# Patient Record
Sex: FEMALE | Race: WHITE | NOT HISPANIC OR LATINO | Employment: UNEMPLOYED | URBAN - METROPOLITAN AREA
[De-identification: names, ages, dates, MRNs, and addresses within clinical notes are randomized per-mention and may not be internally consistent; named-entity substitution may affect disease eponyms.]

---

## 2022-03-14 NOTE — PROGRESS NOTES
"  Physical Therapy Initial Evaluation and Plan of Care      Patient: Tavia Gómez   : 1980  Diagnosis/ICD-10 Code:  Status post ORIF of fracture of ankle [Z98.890, Z87.81]  Referring practitioner: Chacho Chowdhury MD  Date of Initial Visit: 3/15/2022  Today's Date: 3/15/2022  Patient seen for 1 sessions           Subjective Questionnaire: LEFS: 44/80      Subjective Evaluation    History of Present Illness  Date of onset: 2021  Date of surgery: 2021  Mechanism of injury: Pt presents to physical therapy s/p L ankle ORIF. Even up sole on RLE and walking/CAM boot on LLE. Pt reports fell down the stairs landing on L leg resulting in tri-malleolar fx. Next follow up in 2 weeks and going to [place] in 2.5 weeks for spring break with her family. No longer using crutches or scooter.    Hobbies: physical activity, hiking, running, working out      Patient Occupation: not working Quality of life: excellent    Pain  Current pain ratin (not pain, just discomfort)  At best pain ratin  At worst pain ratin  Location: L ankle  Aggravating factors: movement, stairs, standing, ambulation, prolonged positioning, squatting and repetitive movement    Social Support  Lives in: multiple-level home  Lives with: young children    Hand dominance: right    Diagnostic Tests  X-ray: abnormal (fracture in 3 places (trimalleolar))    Patient Goals  Patient goals for therapy: decreased edema, decreased pain, improved balance, increased motion, increased strength and return to sport/leisure activities  Patient goal: \"running on treadmill\" and \"walking up/down steps\"           Objective          Observations   Left Ankle/Foot   Positive for edema, effusion and incision. Negative for drainage.     Additional Ankle/Foot Observation Details  No s/s of injection    Palpation   Left   No palpable tenderness to the lateral gastrocnemius and medial gastrocnemius.   Hypertonic in the peroneus, plantaris, posterior tibialis " and soleus.   Tenderness of the anterior tibialis.     Tenderness   Left Ankle/Foot   Tenderness in the Achilles insertion, anterior ankle, lateral malleolus, medial malleolus and peroneal tendon. No tenderness in the fifth metatarsal base, dorsum foot, metatarsal head and proximal Achilles.     Neurological Testing     Sensation     Ankle/Foot   Left Ankle/Foot   Intact: light touch  Hypersensation: light touch    Additional Neurological Details  Numbness constant    Active Range of Motion   Left Ankle/Foot   Dorsiflexion (ke): 6 degrees with pain  Plantar flexion: 66 degrees   Inversion: 32 degrees   Eversion: 20 degrees     Passive Range of Motion   Left Ankle/Foot    Dorsiflexion (ke): with pain    Strength/Myotome Testing     Left Ankle/Foot   Dorsiflexion: 4  Plantar flexion: 3-  Inversion: 4  Eversion: 4-    Additional Strength Details  Able to complete B heel raise with partial ROM; not able to complete single heel raise    Tests     Additional Tests Details  - jon's sign    Swelling   Left Ankle/Foot   Metatarsal heads: 21.5 cm  Figure 8: 48.5 cm  Malleoli: 23 cm    Right Ankle/Foot   Malleoli: 22 cm    Ambulation   Weight-Bearing Status   Weight-Bearing Status (Left): weight-bearing as tolerated     Observational Gait   Gait: antalgic   Decreased left stance time and left step length.   Left foot contact pattern: foot flat  Base of support: increased    Additional Observational Gait Details  Without boot/shoes          Assessment & Plan     Assessment  Impairments: abnormal gait, abnormal muscle firing, abnormal muscle tone, abnormal or restricted ROM, activity intolerance, impaired balance, impaired physical strength, lacks appropriate home exercise program, pain with function and weight-bearing intolerance  Functional Limitations: sleeping, walking, uncomfortable because of pain, moving in bed, standing, stooping and unable to perform repetitive tasks  Assessment details: Tavia Gómez is a 42 y.o.  year-old female referred to physical therapy for s/p L ankle ORIF (DOS 21). She presents with a stable clinical presentation; still using CAM/walking boot but weaned off crutches and knee scooter.  She has no comorbidities but personal factors of going on vacation in 2-3 weeks that may affect her progress in the plan of care. Pt has ortho MD follow up prior to vacation with hopeful release from walking boot. Signs and symptoms are consistent with physical therapy diagnosis of decreased L ankle AROM and L ankle weakness with impaired gait mechanics. Patient is appropriate for skilled physical therapy in order to reduce pain and increase ease with daily mobility.  Pt educated on desensitization to decrease hypersenstivity, ice as needed, walking over level surfaces with good shoes to prepare for vacation in 2-3 weeks for <5 at a time and initial HEP.  Prognosis: good    Goals  Plan Goals: ST weeks  1. Pt will have L ankle AROM WNL and pain-free in NWB position  2. Pt will demonstrate gait WNL and be able to consistently walk for ~20 min to navigate the grocery store without boot  3. Pt will be able to tolerate SLS on LLE for at least 10 sec without UE support to improve L ankle stability  4. Pt will be able to complete at least 20 B heel/toe raises standing to improve strength  5. Pt will decreased L malleoli swelling to 22 cm or less    LT weeks  1. Pt will demonstrates L ankle single leg heel raise without pain  2. Pt will demonstrate L ankle strength 5/5 in DF, PF, INV, and EV  3. Pt will be able to initiate plyometric training to be able to return to regular workouts and hiking  4.Pt will be able to tolerate SLS on LLE for at least 30 sec without UE support to improve L ankle stability  5. LEFS>60/80    Plan  Therapy options: will be seen for skilled therapy services  Planned modality interventions: cryotherapy, dry needling, electrical stimulation/Russian stimulation, iontophoresis, TENS,  thermotherapy (hydrocollator packs), traction and ultrasound  Planned therapy interventions: abdominal trunk stabilization, ADL retraining, balance/weight-bearing training, body mechanics training, flexibility, functional ROM exercises, gait training, home exercise program, IADL retraining, joint mobilization, transfer training, therapeutic activities, stretching, strengthening, spinal/joint mobilization, soft tissue mobilization, postural training, orthotic fitting/training, neuromuscular re-education and manual therapy  Frequency: 2x week  Treatment plan discussed with: patient  Plan details: 2x per week for 12 weeks        Visit Diagnoses:    ICD-10-CM ICD-9-CM   1. Status post ORIF of fracture of ankle  Z98.890 V45.89    Z87.81 V15.51   2. Decreased range of motion of left ankle  M25.672 719.57   3. Gait disturbance  R26.9 781.2   4. Left leg weakness  R29.898 729.89       Timed:  Manual Therapy:    -     mins  22637;  Therapeutic Exercise:    28     mins  35208;     Neuromuscular Contreras:    -    mins  85754;    Therapeutic Activity:     -     mins  88996;     Gait Training:      -     mins  85193;     Ultrasound:     -     mins  36013;    Electrical Stimulation:    -     mins  66245 ( );    Untimed:  Electrical Stimulation:    -     mins  29261 ( );  Mechanical Traction:    -     mins  65337;     Timed Treatment:   28   mins   Total Treatment:     55   mins    PT SIGNATURE: Anamaria Maynard PT   [place] license: [id number]  DATE TREATMENT INITIATED: 3/15/2022    Initial Certification  Certification Period: 6/13/2022  I certify that the therapy services are furnished while this patient is under my care.  The services outlined above are required by this patient, and will be reviewed every 90 days.     PHYSICIAN: Chacho Chowdhury MD      DATE:     Please sign and return via fax to [phone]. Thank you, [facility].

## 2022-03-15 ENCOUNTER — TREATMENT (OUTPATIENT)
Dept: PHYSICAL THERAPY | Facility: CLINIC | Age: 42
End: 2022-03-15

## 2022-03-15 DIAGNOSIS — R29.898 LEFT LEG WEAKNESS: ICD-10-CM

## 2022-03-15 DIAGNOSIS — M25.672 DECREASED RANGE OF MOTION OF LEFT ANKLE: ICD-10-CM

## 2022-03-15 DIAGNOSIS — Z87.81 STATUS POST ORIF OF FRACTURE OF ANKLE: Primary | ICD-10-CM

## 2022-03-15 DIAGNOSIS — R26.9 GAIT DISTURBANCE: ICD-10-CM

## 2022-03-15 DIAGNOSIS — Z98.890 STATUS POST ORIF OF FRACTURE OF ANKLE: Primary | ICD-10-CM

## 2022-03-15 PROCEDURE — 97162 PT EVAL MOD COMPLEX 30 MIN: CPT | Performed by: PHYSICAL THERAPIST

## 2022-03-15 PROCEDURE — 97110 THERAPEUTIC EXERCISES: CPT | Performed by: PHYSICAL THERAPIST

## 2022-03-17 ENCOUNTER — TREATMENT (OUTPATIENT)
Dept: PHYSICAL THERAPY | Facility: CLINIC | Age: 42
End: 2022-03-17

## 2022-03-17 DIAGNOSIS — Z87.81 STATUS POST ORIF OF FRACTURE OF ANKLE: Primary | ICD-10-CM

## 2022-03-17 DIAGNOSIS — R29.898 LEFT LEG WEAKNESS: ICD-10-CM

## 2022-03-17 DIAGNOSIS — Z98.890 STATUS POST ORIF OF FRACTURE OF ANKLE: Primary | ICD-10-CM

## 2022-03-17 DIAGNOSIS — R26.9 GAIT DISTURBANCE: ICD-10-CM

## 2022-03-17 DIAGNOSIS — M25.672 DECREASED RANGE OF MOTION OF LEFT ANKLE: ICD-10-CM

## 2022-03-17 PROCEDURE — 97110 THERAPEUTIC EXERCISES: CPT | Performed by: PHYSICAL THERAPIST

## 2022-03-17 NOTE — PROGRESS NOTES
Physical Therapy Daily Progress Note      Patient: Tavia Gómez   : 1980  Referring practitioner: No ref. provider found  Date of Initial Visit: Type: THERAPY  Noted: 3/15/2022  Today's Date: 3/17/2022  Patient seen for 2 sessions         Tavia Gómez reports: L ankle pain 2/10 today, increased pain yesterday but unrelated to PT exercises. Pt reports she still picks up her 4 year old from time to time which she contributes to her increased pain. Pt reports she called her surgeon and got permission to start weaning out of her boot.         Objective   See Exercise, Manual, and Modality Logs for complete treatment.       Assessment/Plan  Pt reports she called her surgeon and got permission to start weaning out of her boot; discussed slowly weaning for just a few hours initially prior to full transition out of the boot. Pt and PT also discussed appropriate shoe wear and possible ankle brace for support while more active on vacation in the next few weeks. Weak and painful eversion and inversion; no increased swelling since evaluation, but pt reports she has been icing. Progressed from NWB strengthening to WB exercises/stretches today. Updated and printed new HEP for home. Continued noted antalgia with gait without boot; pt asked to bring in normal tennis shoes for next session for formal gait training and appropriate support during standing exercises. Pt encouraged to continue to ice at home especially with increased activity and weaning out of boot.         Progress per Plan of Care and Progress strengthening /stabilization /functional activity           Timed:  Manual Therapy:    -     mins  68256;  Therapeutic Exercise:    25     mins  40537;     Neuromuscular Contreras:    -    mins  44673;    Therapeutic Activity:     5     mins  26582;     Gait Training:      -     mins  85963;     Ultrasound:     -     mins  01338;      Untimed:  Electrical Stimulation:    -     mins  46623 ( );  Mechanical Traction:     -     mins  09219;   Dry needling:      -     mins  20560/20561    Timed Treatment:   30   mins   Total Treatment:     35   mins    Anamaria Maynard PT  Physical Therapist    License #: [id number]

## 2022-03-23 ENCOUNTER — TREATMENT (OUTPATIENT)
Dept: PHYSICAL THERAPY | Facility: CLINIC | Age: 42
End: 2022-03-23

## 2022-03-23 DIAGNOSIS — Z87.81 STATUS POST ORIF OF FRACTURE OF ANKLE: Primary | ICD-10-CM

## 2022-03-23 DIAGNOSIS — R26.9 GAIT DISTURBANCE: ICD-10-CM

## 2022-03-23 DIAGNOSIS — Z98.890 STATUS POST ORIF OF FRACTURE OF ANKLE: Primary | ICD-10-CM

## 2022-03-23 DIAGNOSIS — R29.898 LEFT LEG WEAKNESS: ICD-10-CM

## 2022-03-23 DIAGNOSIS — M25.672 DECREASED RANGE OF MOTION OF LEFT ANKLE: ICD-10-CM

## 2022-03-23 PROCEDURE — 97110 THERAPEUTIC EXERCISES: CPT | Performed by: PHYSICAL THERAPIST

## 2022-03-23 PROCEDURE — 97140 MANUAL THERAPY 1/> REGIONS: CPT | Performed by: PHYSICAL THERAPIST

## 2022-03-23 NOTE — PROGRESS NOTES
Physical Therapy Daily Treatment Note      Patient: Tavia Gómez   : 1980  Referring practitioner: Chacho Chowdhury MD  Date of Initial Visit: Type: THERAPY  Noted: 3/15/2022  Today's Date: 3/23/2022  Patient seen for 3 sessions       Visit Diagnoses:    ICD-10-CM ICD-9-CM   1. Status post ORIF of fracture of ankle  Z98.890 V45.89    Z87.81 V15.51   2. Decreased range of motion of left ankle  M25.672 719.57   3. Gait disturbance  R26.9 781.2   4. Left leg weakness  R29.898 729.89       Subjective   Had ortho appt on Monday; X-ray looks fine and says I can't hurt my foot with regular daily activities.  Has been out of boot at home.  -3/10 when out of boot.  Sore along the inside of my lower leg.  MD recommended arch supports and a new pair of shoes.  Objective   See Exercise, Manual, and Modality Logs for complete treatment.       Assessment/Plan   Notable palp tenderness with fascial restrictions along post tib muscle/tendon.  Pt educated on OTC inserts for use in shoes.  Good tolerance to ex progression. Pt benefited from verbal/tactile cuing for proper open chain ankle inv/ev form.  Notable soreness with TB open chain ankle inversion so held resistance lightly.  Pt was issued updated HEP printout to facilitate compliance and recall with ex progressions today.  Progress per Plan of Care  Pt instructed to bring a shoe for L foot to the next appt for CKC progressions.            Timed:         Manual Therapy:    8     mins  34023;     Therapeutic Exercise:    26     mins  71363;     Neuromuscular Contreras:    -    mins  46153;    Therapeutic Activity:     5     mins  78837;     Gait Training:      -     mins  51028;       Timed Treatment:   39   mins   Total Treatment:     39   mins    Gladys Olmstead PT  [place] License: #[id number]

## 2022-03-24 ENCOUNTER — TREATMENT (OUTPATIENT)
Dept: PHYSICAL THERAPY | Facility: CLINIC | Age: 42
End: 2022-03-24

## 2022-03-24 DIAGNOSIS — R26.9 GAIT DISTURBANCE: ICD-10-CM

## 2022-03-24 DIAGNOSIS — Z87.81 STATUS POST ORIF OF FRACTURE OF ANKLE: Primary | ICD-10-CM

## 2022-03-24 DIAGNOSIS — R29.898 LEFT LEG WEAKNESS: ICD-10-CM

## 2022-03-24 DIAGNOSIS — M25.672 DECREASED RANGE OF MOTION OF LEFT ANKLE: ICD-10-CM

## 2022-03-24 DIAGNOSIS — Z98.890 STATUS POST ORIF OF FRACTURE OF ANKLE: Primary | ICD-10-CM

## 2022-03-24 PROCEDURE — 97140 MANUAL THERAPY 1/> REGIONS: CPT | Performed by: PHYSICAL THERAPIST

## 2022-03-24 PROCEDURE — 97110 THERAPEUTIC EXERCISES: CPT | Performed by: PHYSICAL THERAPIST

## 2022-03-24 NOTE — PROGRESS NOTES
Physical Therapy Daily Treatment Note      Patient: Tavia Gómez   : 1980  Referring practitioner: No ref. provider found  Date of Initial Visit: Type: THERAPY  Noted: 3/15/2022  Today's Date: 3/24/2022  Patient seen for 4 sessions       Visit Diagnoses:    ICD-10-CM ICD-9-CM   1. Status post ORIF of fracture of ankle  Z98.890 V45.89    Z87.81 V15.51   2. Decreased range of motion of left ankle  M25.672 719.57   3. Gait disturbance  R26.9 781.2   4. Left leg weakness  R29.898 729.89       Subjective   Pt reports she is still having pain along her lower leg  Objective   See Exercise, Manual, and Modality Logs for complete treatment.       Assessment/Plan   slight L lateral shift with mini-squat though well tolerated. Pt continues to exhibit pain with resisted ankle inv so added isometric with printout for strengthening and pain control. Did not add NuStep as pt did not bring shoe for L foot.  Good tolerance to addition of IASTM to manual interventions.    Progress strengthening /stabilization /functional activity  Continue use of ice at home  Begin sridhar 1-3xday for strengthening/pain    Timed:         Manual Therapy:    8     mins  36485;     Therapeutic Exercise:    24     mins  35955;     Neuromuscular Contreras:    -    mins  03791;    Therapeutic Activity:     -     mins  93383;     Gait Training:      -     mins  42178;       Timed Treatment:   32   mins   Total Treatment:     32   mins    PETER Nichols License: #[id number]

## 2022-03-29 ENCOUNTER — TREATMENT (OUTPATIENT)
Dept: PHYSICAL THERAPY | Facility: CLINIC | Age: 42
End: 2022-03-29

## 2022-03-29 DIAGNOSIS — Z98.890 STATUS POST ORIF OF FRACTURE OF ANKLE: Primary | ICD-10-CM

## 2022-03-29 DIAGNOSIS — R29.898 LEFT LEG WEAKNESS: ICD-10-CM

## 2022-03-29 DIAGNOSIS — R26.9 GAIT DISTURBANCE: ICD-10-CM

## 2022-03-29 DIAGNOSIS — M25.672 DECREASED RANGE OF MOTION OF LEFT ANKLE: ICD-10-CM

## 2022-03-29 DIAGNOSIS — Z87.81 STATUS POST ORIF OF FRACTURE OF ANKLE: Primary | ICD-10-CM

## 2022-03-29 PROCEDURE — 97110 THERAPEUTIC EXERCISES: CPT | Performed by: PHYSICAL THERAPIST

## 2022-03-29 NOTE — PROGRESS NOTES
Physical Therapy Daily Progress Note      Patient: Tavia Gómez   : 1980  Referring practitioner: Chacho Chowdhury MD  Date of Initial Visit: Type: THERAPY  Noted: 3/15/2022  Today's Date: 3/29/2022  Patient seen for 5 sessions         Tavia Gómez reports: L ankle/foot 2/10 and by the end of the day 4/10         Objective   See Exercise, Manual, and Modality Logs for complete treatment.       Assessment/Plan  Pt reports she is out of the boot at home most of the time now but barefoot (does not wear shoes in the house). Pt demonstrates improving L ankle AROM in long sitting/NWB position. Moderate pain with ankle inversion; continues isometrics for ankle inversion and light resistance with band. Added step ups today on 6 inch step in all directions; mild pain but mostly just difficulty with ROM/stregnth. Improved squat technique with equal WS. May re-introduce STM if continued post tib tenderness. Gait training without shoes today with slow santana and cues for mechanics; increased antalgia with increased speed, encouraged to focus on quality of gait at home vs speed at this time and get shoes for support if possible.       Progress per Plan of Care and Progress strengthening /stabilization /functional activity           Timed:  Manual Therapy:    -     mins  74930;  Therapeutic Exercise:    28     mins  04566;     Neuromuscular Contreras:    -    mins  15959;    Therapeutic Activity:     -     mins  86624;     Gait Trainin     mins  94238;     Ultrasound:     -     mins  61454;      Untimed:  Electrical Stimulation:    -     mins  92018 ( );  Mechanical Traction:    -     mins  37563;   Dry needling:      -     mins  82818/    Timed Treatment:   33   mins   Total Treatment:     35   mins  Anamaria Maynard PT  Physical Therapist    License #: [id number]

## 2022-03-31 ENCOUNTER — TREATMENT (OUTPATIENT)
Dept: PHYSICAL THERAPY | Facility: CLINIC | Age: 42
End: 2022-03-31

## 2022-03-31 DIAGNOSIS — Z98.890 STATUS POST ORIF OF FRACTURE OF ANKLE: Primary | ICD-10-CM

## 2022-03-31 DIAGNOSIS — Z87.81 STATUS POST ORIF OF FRACTURE OF ANKLE: Primary | ICD-10-CM

## 2022-03-31 DIAGNOSIS — R29.898 LEFT LEG WEAKNESS: ICD-10-CM

## 2022-03-31 DIAGNOSIS — R26.9 GAIT DISTURBANCE: ICD-10-CM

## 2022-03-31 DIAGNOSIS — M25.672 DECREASED RANGE OF MOTION OF LEFT ANKLE: ICD-10-CM

## 2022-03-31 PROCEDURE — 97110 THERAPEUTIC EXERCISES: CPT | Performed by: PHYSICAL THERAPIST

## 2022-03-31 PROCEDURE — 97140 MANUAL THERAPY 1/> REGIONS: CPT | Performed by: PHYSICAL THERAPIST

## 2022-03-31 PROCEDURE — 97112 NEUROMUSCULAR REEDUCATION: CPT | Performed by: PHYSICAL THERAPIST

## 2022-03-31 NOTE — PROGRESS NOTES
Physical Therapy Daily Progress Note      Patient: Tavia Gómez   : 1980  Referring practitioner: Chacho Chowdhury MD  Date of Initial Visit: Type: THERAPY  Noted: 3/15/2022  Today's Date: 3/31/2022  Patient seen for 6 sessions         Tavia Gómez reports: she got new tennis shoes but unsure of height; HOKA with sig cushion and support         Objective   See Exercise, Manual, and Modality Logs for complete treatment.       Assessment/Plan  Pt wearing tennis shoes today for all upright/standing exercises. Slightly antalgic gait but improving speed/santana.  Able to increase step ups today in all directions and add some balance/stability exercises; tandem gait and uni-clock with stance on LLE. Improving squat technique and depth. Increased reps of strengthening exercises today. Plan to continue to progress balance and strength. Re-introduced STM to post-tib and soleus today with moderate tenderness throughout; pt educated on self scar massage to complete at home.        Progress per Plan of Care and Progress strengthening /stabilization /functional activity       Timed:  Manual Therapy:    9     mins  23559;  Therapeutic Exercise:    14     mins  08827;     Neuromuscular Contreras:    8    mins  13237;    Therapeutic Activity:     -     mins  81273;     Gait Training:      -     mins  99477;     Ultrasound:     -     mins  84728;      Untimed:  Electrical Stimulation:    -     mins  39145 ( );  Mechanical Traction:    -     mins  79247;   Dry needling:      -     mins  90180/    Timed Treatment:   31   mins   Total Treatment:     43   mins  Anamaria Maynard PT  Physical Therapist    License #: [id number]

## 2022-04-12 ENCOUNTER — TREATMENT (OUTPATIENT)
Dept: PHYSICAL THERAPY | Facility: CLINIC | Age: 42
End: 2022-04-12

## 2022-04-12 DIAGNOSIS — M25.672 DECREASED RANGE OF MOTION OF LEFT ANKLE: ICD-10-CM

## 2022-04-12 DIAGNOSIS — R26.9 GAIT DISTURBANCE: ICD-10-CM

## 2022-04-12 DIAGNOSIS — R29.898 LEFT LEG WEAKNESS: ICD-10-CM

## 2022-04-12 DIAGNOSIS — Z87.81 STATUS POST ORIF OF FRACTURE OF ANKLE: Primary | ICD-10-CM

## 2022-04-12 DIAGNOSIS — Z98.890 STATUS POST ORIF OF FRACTURE OF ANKLE: Primary | ICD-10-CM

## 2022-04-12 PROCEDURE — A9999 DME SUPPLY OR ACCESSORY, NOS: HCPCS | Performed by: PHYSICAL THERAPIST

## 2022-04-12 PROCEDURE — 97110 THERAPEUTIC EXERCISES: CPT | Performed by: PHYSICAL THERAPIST

## 2022-04-12 PROCEDURE — 97140 MANUAL THERAPY 1/> REGIONS: CPT | Performed by: PHYSICAL THERAPIST

## 2022-04-12 NOTE — PROGRESS NOTES
Physical Therapy Daily Progress Note      Patient: Tavia Gómez   : 1980  Referring practitioner: Chacho Chowdhury MD  Date of Initial Visit: Type: THERAPY  Noted: 3/15/2022  Today's Date: 2022  Patient seen for 7 sessions         Tavia Gómez reports: vacation went well; increased swelling at the end of the day. Used electric scooters as able to get around town. L ankle pain 1/10. No follow up with MD; per MD report if not pain free by  to contact his office.        Objective   See Exercise, Manual, and Modality Logs for complete treatment.       Assessment/Plan  Pt able to progress strengthening and progressed to [employer] for single leg balance activity today. Continued STM along post tib and soleus to decrease muscle tension and ice following treatment. L ankle inversion without resistance with minimal to no pain; inversion with resistance up to 4/10 pain. Pt out of CAM boot majority of the time now and wearing supportive tennis shoes; still reports swelling especially at the end of the day. Pt educated that swelling will most likely continue with increase in activity; may need compression sock with increased physical activity. Pt purchased small/cervical ice pack for home for her L ankle $20.        Progress per Plan of Care and Progress strengthening /stabilization /functional activity       Timed:  Manual Therapy:    8     mins  45289;  Therapeutic Exercise:    21     mins  11417;     Neuromuscular Contreras:    -    mins  61049;    Therapeutic Activity:     -     mins  74401;     Gait Training:      -     mins  52011;     Ultrasound:     -     mins  97531;      Untimed:  Electrical Stimulation:    -     mins  92435 ( );  Mechanical Traction:    -     mins  22976;   Dry needling:      -     mins  85385/    Timed Treatment:   29   mins   Total Treatment:     45   mins    Anamaria Maynard PT  Physical Therapist    License #: [id number]

## 2022-04-13 NOTE — PROGRESS NOTES
Re-Assessment / Re-Certification        Patient: Tavia Gómez   : 1980  Diagnosis/ICD-10 Code:  Status post ORIF of fracture of ankle [Z98.890, Z87.81]  Referring practitioner: Chacho Chowdhury MD  Date of Initial Visit: Type: THERAPY  Noted: 3/15/2022  Today's Date: 2022  Patient seen for 8 sessions      Subjective:   Tavia Gómez reports: increased swelling after standing for 1.5 hours doing dishes;  broken.  Subjective Questionnaire: LEFS: 52/80  Clinical Progress: improved  Home Program Compliance: Yes  Treatment has included: therapeutic exercise, neuromuscular re-education, manual therapy, therapeutic activity, gait training and cryotherapy      Objective     Active Range of Motion   Left Ankle/Foot   Dorsiflexion (ke): 10 degrees  Plantar flexion: 58 degrees   Inversion: 40 degrees   Eversion: 18 degrees      Passive Range of Motion   Left Ankle/Foot    Inversion: with pain     Strength/Myotome Testing      Left Ankle/Foot   Dorsiflexion: 4  Plantar flexion: 3+  Inversion: 4  Eversion: 4-    Additional Strength Details  Able to complete B heel raise with full ROM; not able to complete single heel raise     Swelling   Left Ankle/Foot   Metatarsal heads: 21.5 cm  Figure 8: 50 cm  Malleoli: 24 cm     Right Ankle/Foot   Malleoli: 22 cm     Ambulation   Weight-Bearing Status   Weight-Bearing Status (Left): weight-bearing as tolerated      Observational Gait   Gait: antalgic   Decreased left stance time and left step length.   Left foot contact pattern: foot flat  Base of support: increased    Additional Observational Gait Details  With tennis shoes    Functional Assessment  SLB: 12-23 sec on LLE    Assessment & Plan     Assessment  Impairments: abnormal gait, abnormal muscle firing, abnormal muscle tone, abnormal or restricted ROM, activity intolerance, impaired balance, impaired physical strength, lacks appropriate home exercise program, pain with function and weight-bearing  intolerance  Functional Limitations: sleeping, walking, uncomfortable because of pain, moving in bed, standing, stooping and unable to perform repetitive tasks  Assessment details: Tavia Gómez is a 42 y.o. year-old female referred to physical therapy for s/p L ankle ORIF (DOS 21). Pt has weaned out of CAM/walking boot and wearing supportive tennis shoes for functional mobility, slight increase in swelling with increased activity outside of boot but controlled with ice/elevation. Pt with improving L ankle AROM and PROM; most painful inversion with resistance. Pt has progressed from mat exercises to single leg balance standing exercises and more dynamic exercises. Gait mildly antalgic and slight decrease in speed with tennis shoes vs boot. Patient is appropriate for skilled physical therapy in order to reduce pain and increase ease with daily mobility.  Pt educated on desensitization to decrease hypersenstivity, ice/elevation as needed, walking/gait mechanics.  Prognosis: good    Goals  Plan Goals: ST weeks  1. Pt will have L ankle AROM WNL and pain-free in NWB position-MET  2. Pt will demonstrate gait WNL and be able to consistently walk for ~20 min to navigate the grocery store without boot-MET  3. Pt will be able to tolerate SLS on LLE for at least 10 sec without UE support to improve L ankle stability-MET  4. Pt will be able to complete at least 20 B heel/toe raises standing to improve strength-MET  5. Pt will decreased L malleoli swelling to 22 cm or less-PROGRESSING    LT weeks  1. Pt will demonstrates L ankle single leg heel raise without pain-PROGRESSING  2. Pt will demonstrate L ankle strength 5/5 in DF, PF, INV, and EV-PROGRESSING  3. Pt will be able to initiate plyometric training to be able to return to regular workouts and hiking-PROGRESSING  4.Pt will be able to tolerate SLS on LLE for at least 30 sec without UE support to improve L ankle stability-PROGRESSING  5. LEFS>60/80-PROGRESSING  52/80    Plan  Therapy options: will be seen for skilled therapy services  Planned modality interventions: cryotherapy, dry needling, electrical stimulation/Russian stimulation, iontophoresis, TENS, thermotherapy (hydrocollator packs), traction and ultrasound  Planned therapy interventions: abdominal trunk stabilization, ADL retraining, balance/weight-bearing training, body mechanics training, flexibility, functional ROM exercises, gait training, home exercise program, IADL retraining, joint mobilization, transfer training, therapeutic activities, stretching, strengthening, spinal/joint mobilization, soft tissue mobilization, postural training, orthotic fitting/training, neuromuscular re-education and manual therapy  Frequency: 2x week  Treatment plan discussed with: patient  Plan details: 2x per week for 8 weeks        Visit Diagnoses:    ICD-10-CM ICD-9-CM   1. Status post ORIF of fracture of ankle  Z98.890 V45.89    Z87.81 V15.51   2. Decreased range of motion of left ankle  M25.672 719.57   3. Gait disturbance  R26.9 781.2   4. Left leg weakness  R29.898 729.89        Anamaria Tobar PT, DPT  [place] license number: [id number]     Timed:  Manual Therapy:    8     mins  54676;  Therapeutic Exercise:    18     mins  66660;     Neuromuscular Contreras:    15    mins  15984;    Therapeutic Activity:     -     mins  46384;     Gait Training:      -     mins  91678;     Ultrasound:     -     mins  94993;    Electrical Stimulation:    -     mins  99276 ( );    Untimed:  Electrical Stimulation:    -     mins  61871 ( );  Mechanical Traction:    -     mins  54178; \  Dry needling:      -     mins  29082/20561    Timed Treatment:   41   mins   Total Treatment:     45   mins

## 2022-04-14 ENCOUNTER — TREATMENT (OUTPATIENT)
Dept: PHYSICAL THERAPY | Facility: CLINIC | Age: 42
End: 2022-04-14

## 2022-04-14 DIAGNOSIS — Z98.890 STATUS POST ORIF OF FRACTURE OF ANKLE: Primary | ICD-10-CM

## 2022-04-14 DIAGNOSIS — R26.9 GAIT DISTURBANCE: ICD-10-CM

## 2022-04-14 DIAGNOSIS — R29.898 LEFT LEG WEAKNESS: ICD-10-CM

## 2022-04-14 DIAGNOSIS — Z87.81 STATUS POST ORIF OF FRACTURE OF ANKLE: Primary | ICD-10-CM

## 2022-04-14 DIAGNOSIS — M25.672 DECREASED RANGE OF MOTION OF LEFT ANKLE: ICD-10-CM

## 2022-04-14 PROCEDURE — 97110 THERAPEUTIC EXERCISES: CPT | Performed by: PHYSICAL THERAPIST

## 2022-04-14 PROCEDURE — 97140 MANUAL THERAPY 1/> REGIONS: CPT | Performed by: PHYSICAL THERAPIST

## 2022-04-14 PROCEDURE — 97112 NEUROMUSCULAR REEDUCATION: CPT | Performed by: PHYSICAL THERAPIST

## 2022-04-19 ENCOUNTER — TREATMENT (OUTPATIENT)
Dept: PHYSICAL THERAPY | Facility: CLINIC | Age: 42
End: 2022-04-19

## 2022-04-19 DIAGNOSIS — R29.898 LEFT LEG WEAKNESS: ICD-10-CM

## 2022-04-19 DIAGNOSIS — M25.672 DECREASED RANGE OF MOTION OF LEFT ANKLE: ICD-10-CM

## 2022-04-19 DIAGNOSIS — R26.9 GAIT DISTURBANCE: ICD-10-CM

## 2022-04-19 DIAGNOSIS — Z98.890 STATUS POST ORIF OF FRACTURE OF ANKLE: Primary | ICD-10-CM

## 2022-04-19 DIAGNOSIS — Z87.81 STATUS POST ORIF OF FRACTURE OF ANKLE: Primary | ICD-10-CM

## 2022-04-19 PROCEDURE — 97140 MANUAL THERAPY 1/> REGIONS: CPT | Performed by: PHYSICAL THERAPIST

## 2022-04-19 PROCEDURE — 97112 NEUROMUSCULAR REEDUCATION: CPT | Performed by: PHYSICAL THERAPIST

## 2022-04-19 PROCEDURE — 97110 THERAPEUTIC EXERCISES: CPT | Performed by: PHYSICAL THERAPIST

## 2022-04-19 NOTE — PROGRESS NOTES
Physical Therapy Daily Progress Note      Patient: Tavia Gómez   : 1980  Referring practitioner: Chacho Chowdhury MD  Date of Initial Visit: Type: THERAPY  Noted: 3/15/2022  Today's Date: 2022  Patient seen for 9 sessions         Tavia Gómez reports: getting better, I am trying to do more, it doesn't swell up as bad as it used to.       Objective   See Exercise, Manual, and Modality Logs for complete treatment.       Assessment/Plan  Pt able to tolerate increased in ankle resistance band strengthening and improving strength/tolerance with inversion isometrics. Increased height of step ups from 6 inch to 8 inches today without increased in pain. Plan to continue to progress balance training and more dynamic activity in the next few sessions with improving strength and stability of L ankle.        Progress per Plan of Care and Progress strengthening /stabilization /functional activity           Timed:  Manual Therapy:    8     mins  02301;  Therapeutic Exercise:    10     mins  19259;     Neuromuscular Contreras:    14    mins  91396;    Therapeutic Activity:     -     mins  53080;     Gait Training:      -     mins  41857;     Ultrasound:     -     mins  29355;      Untimed:  Electrical Stimulation:    -     mins  10013 ( );  Mechanical Traction:    -     mins  86928;   Dry needling:      -     mins  05684/    Timed Treatment:   32   mins   Total Treatment:     45   mins  Anamaria Maynard PT  Physical Therapist    License #: [id number]

## 2022-04-21 ENCOUNTER — TREATMENT (OUTPATIENT)
Dept: PHYSICAL THERAPY | Facility: CLINIC | Age: 42
End: 2022-04-21

## 2022-04-21 DIAGNOSIS — Z87.81 STATUS POST ORIF OF FRACTURE OF ANKLE: Primary | ICD-10-CM

## 2022-04-21 DIAGNOSIS — R26.9 GAIT DISTURBANCE: ICD-10-CM

## 2022-04-21 DIAGNOSIS — R29.898 LEFT LEG WEAKNESS: ICD-10-CM

## 2022-04-21 DIAGNOSIS — M25.672 DECREASED RANGE OF MOTION OF LEFT ANKLE: ICD-10-CM

## 2022-04-21 DIAGNOSIS — Z98.890 STATUS POST ORIF OF FRACTURE OF ANKLE: Primary | ICD-10-CM

## 2022-04-21 PROCEDURE — 97112 NEUROMUSCULAR REEDUCATION: CPT | Performed by: PHYSICAL THERAPIST

## 2022-04-21 PROCEDURE — 97110 THERAPEUTIC EXERCISES: CPT | Performed by: PHYSICAL THERAPIST

## 2022-04-21 PROCEDURE — 97140 MANUAL THERAPY 1/> REGIONS: CPT | Performed by: PHYSICAL THERAPIST

## 2022-04-21 NOTE — PROGRESS NOTES
Physical Therapy Daily Progress Note      Patient: Tavia Gómez   : 1980  Referring practitioner: Chacho Chowdhury MD  Date of Initial Visit: Type: THERAPY  Noted: 3/15/2022  Today's Date: 2022  Patient seen for 10 sessions         Tavia Gómez reports: new pain across anterior ankle, not so much medial ankle         Objective   See Exercise, Manual, and Modality Logs for complete treatment.       Assessment/Plan  Pt with PROM with no pain with inversion; stretch with eversion, no pain. No pain with DF/PF. Painful inversion against resistance. Pt progressed to squats on unlevel surface and ladder drills today; demonstration of foot coordination with ladder drills for proper technique. Plan to continue to progress strength and stability of L ankle. Noted more normalized gait pattern with tennis shoes on today until end of session with fatigue/pain.        Progress per Plan of Care and Progress strengthening /stabilization /functional activity           Timed:  Manual Therapy:    8     mins  62866;  Therapeutic Exercise:    14     mins  81419;     Neuromuscular Contreras:    11    mins  29442;    Therapeutic Activity:     -     mins  69851;     Gait Training:      -     mins  58250;     Ultrasound:     -     mins  13515;      Untimed:  Electrical Stimulation:    -     mins  84500 ( );  Mechanical Traction:    -     mins  72845;   Dry needling:      -     mins  00849/    Timed Treatment:   33   mins   Total Treatment:     45   mins  Anamaria Maynard PT  Physical Therapist    License #: [id number]

## 2022-04-26 ENCOUNTER — TREATMENT (OUTPATIENT)
Dept: PHYSICAL THERAPY | Facility: CLINIC | Age: 42
End: 2022-04-26

## 2022-04-26 DIAGNOSIS — R26.9 GAIT DISTURBANCE: ICD-10-CM

## 2022-04-26 DIAGNOSIS — R29.898 LEFT LEG WEAKNESS: ICD-10-CM

## 2022-04-26 DIAGNOSIS — Z98.890 STATUS POST ORIF OF FRACTURE OF ANKLE: Primary | ICD-10-CM

## 2022-04-26 DIAGNOSIS — M25.672 DECREASED RANGE OF MOTION OF LEFT ANKLE: ICD-10-CM

## 2022-04-26 DIAGNOSIS — Z87.81 STATUS POST ORIF OF FRACTURE OF ANKLE: Primary | ICD-10-CM

## 2022-04-26 PROCEDURE — 97110 THERAPEUTIC EXERCISES: CPT | Performed by: PHYSICAL THERAPIST

## 2022-04-26 PROCEDURE — 97140 MANUAL THERAPY 1/> REGIONS: CPT | Performed by: PHYSICAL THERAPIST

## 2022-04-26 NOTE — PROGRESS NOTES
Physical Therapy Daily Progress Note      Patient: Tavia Gómez   : 1980  Referring practitioner: Chacho Chowdhury MD  Date of Initial Visit: Type: THERAPY  Noted: 3/15/2022  Today's Date: 2022  Patient seen for 11 sessions         Tavia Gómez reports: snapping along lateral ankle; started this morning      Objective   See Exercise, Manual, and Modality Logs for complete treatment.     Hypertonic and tenderness along peroneals and soleus      Assessment/Plan  Added PROM to L ankle to decrease tightness along peroneals; continues STM to soleus. No increase/change in strengthening today; progressed heel/toe raises to off a step vs on floor and added ankle inv and eversion to standing calf/soleus stretches to decrease tightness. Noted increased antalgia walking in and walking out; pt advised to stretch again later today and monitor snapping. No snapping during exercises except with heel raises off step; no pain, only tightness.        Progress per Plan of Care and Progress strengthening /stabilization /functional activity           Timed:  Manual Therapy:    8     mins  52766;  Therapeutic Exercise:    25     mins  40925;     Neuromuscular Contreras:    -    mins  36060;    Therapeutic Activity:     -     mins  04681;     Gait Training:      -     mins  17392;     Ultrasound:     -     mins  39758;      Untimed:  Electrical Stimulation:    -     mins  35602 ( );  Mechanical Traction:    -     mins  56457;   Dry needling:      -     mins  14431/    Timed Treatment:   33   mins   Total Treatment:     35   mins  Anamaria Maynard PT  Physical Therapist    License #: [id number]

## 2022-04-28 ENCOUNTER — TREATMENT (OUTPATIENT)
Dept: PHYSICAL THERAPY | Facility: CLINIC | Age: 42
End: 2022-04-28

## 2022-04-28 DIAGNOSIS — Z87.81 STATUS POST ORIF OF FRACTURE OF ANKLE: Primary | ICD-10-CM

## 2022-04-28 DIAGNOSIS — R26.9 GAIT DISTURBANCE: ICD-10-CM

## 2022-04-28 DIAGNOSIS — M25.672 DECREASED RANGE OF MOTION OF LEFT ANKLE: ICD-10-CM

## 2022-04-28 DIAGNOSIS — Z98.890 STATUS POST ORIF OF FRACTURE OF ANKLE: Primary | ICD-10-CM

## 2022-04-28 DIAGNOSIS — R29.898 LEFT LEG WEAKNESS: ICD-10-CM

## 2022-04-28 PROCEDURE — 97530 THERAPEUTIC ACTIVITIES: CPT | Performed by: PHYSICAL THERAPIST

## 2022-04-28 PROCEDURE — 97110 THERAPEUTIC EXERCISES: CPT | Performed by: PHYSICAL THERAPIST

## 2022-04-28 PROCEDURE — 97140 MANUAL THERAPY 1/> REGIONS: CPT | Performed by: PHYSICAL THERAPIST

## 2022-04-28 NOTE — PROGRESS NOTES
"   Physical Therapy Daily Progress Note      Patient: Tavia Gómez   : 1980  Referring practitioner: Chacho Chowdhury MD  Date of Initial Visit: Type: THERAPY  Noted: 3/15/2022  Today's Date: 2022  Patient seen for 12 sessions         Tavia Gómez reports: L ankle snapping \"the more I am active the more tight it is at night and in the morning.\" Pt reports she keeps her ankle PF with tight sheets at night; advised to loosen at night to allow free range of ankle throughout the night.       Objective   See Exercise, Manual, and Modality Logs for complete treatment.       Assessment/Plan  Pt reports still snapping sensation first thing in the morning with tight eversion PROM, no pain or tightness in other directions. Pt with increased pain with ladder drills today; able to tolerate with cues for decreased speed and stepping, deferred hopping. May try ladder drills towards the first part of exercises vs at end the end next session. Plan to continue to progress strength and stability of L ankle; AROM WNL. Discussed shoe wear for derby plans for stability and comfort of L ankle.       Progress per Plan of Care and Progress strengthening /stabilization /functional activity           Timed:  Manual Therapy:    8     mins  60825;  Therapeutic Exercise:    15     mins  61716;     Neuromuscular Contreras:    -    mins  91070;    Therapeutic Activity:     10     mins  69927;     Gait Training:      -     mins  80489;     Ultrasound:     -     mins  32524;      Untimed:  Electrical Stimulation:    -     mins  71414 ( );  Mechanical Traction:    -     mins  39051;   Dry needling:      -     mins  30917/    Timed Treatment:   33   mins   Total Treatment:     45   mins    Anamaria Maynard PT  Physical Therapist    License #: [id number]                "

## 2022-05-03 ENCOUNTER — TREATMENT (OUTPATIENT)
Dept: PHYSICAL THERAPY | Facility: CLINIC | Age: 42
End: 2022-05-03

## 2022-05-03 DIAGNOSIS — Z87.81 STATUS POST ORIF OF FRACTURE OF ANKLE: Primary | ICD-10-CM

## 2022-05-03 DIAGNOSIS — M25.672 DECREASED RANGE OF MOTION OF LEFT ANKLE: ICD-10-CM

## 2022-05-03 DIAGNOSIS — Z98.890 STATUS POST ORIF OF FRACTURE OF ANKLE: Primary | ICD-10-CM

## 2022-05-03 DIAGNOSIS — R26.9 GAIT DISTURBANCE: ICD-10-CM

## 2022-05-03 DIAGNOSIS — R29.898 LEFT LEG WEAKNESS: ICD-10-CM

## 2022-05-05 ENCOUNTER — TREATMENT (OUTPATIENT)
Dept: PHYSICAL THERAPY | Facility: CLINIC | Age: 42
End: 2022-05-05

## 2022-05-05 DIAGNOSIS — M25.672 DECREASED RANGE OF MOTION OF LEFT ANKLE: ICD-10-CM

## 2022-05-05 DIAGNOSIS — Z98.890 STATUS POST ORIF OF FRACTURE OF ANKLE: Primary | ICD-10-CM

## 2022-05-05 DIAGNOSIS — R29.898 LEFT LEG WEAKNESS: ICD-10-CM

## 2022-05-05 DIAGNOSIS — Z87.81 STATUS POST ORIF OF FRACTURE OF ANKLE: Primary | ICD-10-CM

## 2022-05-05 DIAGNOSIS — R26.9 GAIT DISTURBANCE: ICD-10-CM

## 2022-05-05 PROCEDURE — 97110 THERAPEUTIC EXERCISES: CPT | Performed by: PHYSICAL THERAPIST

## 2022-05-05 PROCEDURE — 97112 NEUROMUSCULAR REEDUCATION: CPT | Performed by: PHYSICAL THERAPIST

## 2022-05-05 PROCEDURE — 97140 MANUAL THERAPY 1/> REGIONS: CPT | Performed by: PHYSICAL THERAPIST

## 2022-05-05 NOTE — PROGRESS NOTES
"   Physical Therapy Daily Progress Note      Patient: Tavia Gómez   : 1980  Referring practitioner: Chacho Chowdhury MD  Date of Initial Visit: Type: THERAPY  Noted: 3/15/2022  Today's Date: 2022  Patient seen for 13 sessions         Tavia Gómez reports: it's doing better; I was able to jog after my son yesterday without L ankle pain              Objective   See Exercise, Manual, and Modality Logs for complete treatment.       Assessment/Plan  Completed more dynamic activity first today with improved pain and technique with ladder and balance tasks. Progressed SLS on foam to SLS on foam with rebounder and increased BAPS board balance from level 2 to level 3. Pt requires cues for control vs speed on BAPS board. Pt reports decreased popping sensation and improving \"natural\" gait.       Progress per Plan of Care and Progress strengthening /stabilization /functional activity           Timed:  Manual Therapy:    8     mins  26156;  Therapeutic Exercise:    12     mins  81660;     Neuromuscular Contreras:    14    mins  16777;    Therapeutic Activity:     -     mins  51212;     Gait Training:      -     mins  29138;     Ultrasound:     -     mins  55137;      Untimed:  Electrical Stimulation:    -     mins  35218 ( );  Mechanical Traction:    -     mins  37619;   Dry needling:      -     mins  04039/    Timed Treatment:   34   mins   Total Treatment:     34   mins  Anamaria Maynard PT  Physical Therapist    License #: [id number]                "

## 2022-05-10 ENCOUNTER — TREATMENT (OUTPATIENT)
Dept: PHYSICAL THERAPY | Facility: CLINIC | Age: 42
End: 2022-05-10

## 2022-05-10 DIAGNOSIS — R29.898 LEFT LEG WEAKNESS: ICD-10-CM

## 2022-05-10 DIAGNOSIS — Z87.81 STATUS POST ORIF OF FRACTURE OF ANKLE: Primary | ICD-10-CM

## 2022-05-10 DIAGNOSIS — Z98.890 STATUS POST ORIF OF FRACTURE OF ANKLE: Primary | ICD-10-CM

## 2022-05-10 DIAGNOSIS — R26.9 GAIT DISTURBANCE: ICD-10-CM

## 2022-05-10 DIAGNOSIS — M25.672 DECREASED RANGE OF MOTION OF LEFT ANKLE: ICD-10-CM

## 2022-05-10 PROCEDURE — 97112 NEUROMUSCULAR REEDUCATION: CPT | Performed by: PHYSICAL THERAPIST

## 2022-05-10 PROCEDURE — 97110 THERAPEUTIC EXERCISES: CPT | Performed by: PHYSICAL THERAPIST

## 2022-05-10 NOTE — PROGRESS NOTES
"   Physical Therapy Daily Progress Note      Patient: Tavia Gómez   : 1980  Referring practitioner: Chacho Chowdhury MD  Date of Initial Visit: Type: THERAPY  Noted: 3/15/2022  Today's Date: 5/10/2022  Patient seen for 14 sessions         Tavia Gómez reports: she was able to enjoy [employer] weekend; first day her shoes were comfortable and second day caused a little more pain but was on her feet most of the day; \"a lot of walking\"         Objective   See Exercise, Manual, and Modality Logs for complete treatment.       Assessment/Plan  Pt with controlled swelling after very active weekend; a lot of standing and walking at the [facility], but she did wear comfortable shoes the first day with good support. Progressed to tandem stance on 1/2 foam roll; improving balance and coordination with ladder drills most difficult sequence laterally and leading with LLE. Improving static balance with rebounder toss; plan to progress to lunges/step overs onto unstable BOSU next session to progress ankle stability and balance.        Progress per Plan of Care and Progress strengthening /stabilization /functional activity         Timed:  Manual Therapy:    6     mins  55057;  Therapeutic Exercise:    11     mins  37947;     Neuromuscular Contreras:    13    mins  31415;    Therapeutic Activity:     -     mins  84210;     Gait Training:      -     mins  08209;     Ultrasound:     -     mins  59885;      Untimed:  Electrical Stimulation:    -     mins  57402 ( );  Mechanical Traction:    -     mins  40622;   Dry needling:      -     mins  10202/    Timed Treatment:   30   mins   Total Treatment:     40   mins    Anamaria Maynard PT  Physical Therapist    License #: [id number]                "

## 2022-05-12 ENCOUNTER — TREATMENT (OUTPATIENT)
Dept: PHYSICAL THERAPY | Facility: CLINIC | Age: 42
End: 2022-05-12

## 2022-05-12 DIAGNOSIS — Z98.890 STATUS POST ORIF OF FRACTURE OF ANKLE: Primary | ICD-10-CM

## 2022-05-12 DIAGNOSIS — Z87.81 STATUS POST ORIF OF FRACTURE OF ANKLE: Primary | ICD-10-CM

## 2022-05-12 DIAGNOSIS — M25.672 DECREASED RANGE OF MOTION OF LEFT ANKLE: ICD-10-CM

## 2022-05-12 DIAGNOSIS — R26.9 GAIT DISTURBANCE: ICD-10-CM

## 2022-05-12 DIAGNOSIS — R29.898 LEFT LEG WEAKNESS: ICD-10-CM

## 2022-05-12 PROCEDURE — 97110 THERAPEUTIC EXERCISES: CPT | Performed by: PHYSICAL THERAPIST

## 2022-05-12 PROCEDURE — 97140 MANUAL THERAPY 1/> REGIONS: CPT | Performed by: PHYSICAL THERAPIST

## 2022-05-12 PROCEDURE — 97530 THERAPEUTIC ACTIVITIES: CPT | Performed by: PHYSICAL THERAPIST

## 2022-05-12 PROCEDURE — 97112 NEUROMUSCULAR REEDUCATION: CPT | Performed by: PHYSICAL THERAPIST

## 2022-05-24 ENCOUNTER — TREATMENT (OUTPATIENT)
Dept: PHYSICAL THERAPY | Facility: CLINIC | Age: 42
End: 2022-05-24

## 2022-05-24 DIAGNOSIS — Z87.81 STATUS POST ORIF OF FRACTURE OF ANKLE: Primary | ICD-10-CM

## 2022-05-24 DIAGNOSIS — M25.672 DECREASED RANGE OF MOTION OF LEFT ANKLE: ICD-10-CM

## 2022-05-24 DIAGNOSIS — Z98.890 STATUS POST ORIF OF FRACTURE OF ANKLE: Primary | ICD-10-CM

## 2022-05-24 DIAGNOSIS — R26.9 GAIT DISTURBANCE: ICD-10-CM

## 2022-05-24 DIAGNOSIS — R29.898 LEFT LEG WEAKNESS: ICD-10-CM

## 2022-05-24 PROCEDURE — 97112 NEUROMUSCULAR REEDUCATION: CPT | Performed by: PHYSICAL THERAPIST

## 2022-05-24 PROCEDURE — 97110 THERAPEUTIC EXERCISES: CPT | Performed by: PHYSICAL THERAPIST

## 2022-05-24 NOTE — PROGRESS NOTES
"   Physical Therapy Daily Progress Note      Patient: Tavia Gómez   : 1980  Referring practitioner: Chacho Chowdhury MD  Date of Initial Visit: Type: THERAPY  Noted: 3/15/2022  Today's Date: 2022  Patient seen for 16 sessions         Tavia Gómez reports: L ankle still hurts intermittently; still can't do a single leg heel raise but has been working on it. \"I am able to michelle after my kids now.\" Pt also reports she is going out of town for the long weekend.          Objective   See Exercise, Manual, and Modality Logs for complete treatment.       Assessment/Plan  Mild pain with ladder drills today and added forward T's for balance training and L ankle stability. Plan to continue to progress strength until pt able to single leg heel raise; add single leg eccentric lowering next session on floor to progress heel raises off step. L ankle AROM WNL.        Progress per Plan of Care and Progress strengthening /stabilization /functional activity           Timed:  Manual Therapy:    -     mins  16894;  Therapeutic Exercise:    18     mins  25016;     Neuromuscular Contreras:    13    mins  77437;    Therapeutic Activity:     -     mins  82172;     Gait Training:      -     mins  01486;     Ultrasound:     -     mins  20713;      Untimed:  Electrical Stimulation:    -     mins  55099 ( );  Mechanical Traction:    -     mins  71811;   Dry needling:      -     mins  57261/    Timed Treatment:   31   mins   Total Treatment:     45   mins    Anamaria Maynard PT   Physical Therapist    License #: [id number]                "

## 2022-05-31 ENCOUNTER — TREATMENT (OUTPATIENT)
Dept: PHYSICAL THERAPY | Facility: CLINIC | Age: 42
End: 2022-05-31

## 2022-05-31 DIAGNOSIS — R26.9 GAIT DISTURBANCE: ICD-10-CM

## 2022-05-31 DIAGNOSIS — R29.898 LEFT LEG WEAKNESS: ICD-10-CM

## 2022-05-31 DIAGNOSIS — Z87.81 STATUS POST ORIF OF FRACTURE OF ANKLE: Primary | ICD-10-CM

## 2022-05-31 DIAGNOSIS — M25.672 DECREASED RANGE OF MOTION OF LEFT ANKLE: ICD-10-CM

## 2022-05-31 DIAGNOSIS — Z98.890 STATUS POST ORIF OF FRACTURE OF ANKLE: Primary | ICD-10-CM

## 2022-05-31 PROCEDURE — 97530 THERAPEUTIC ACTIVITIES: CPT | Performed by: PHYSICAL THERAPIST

## 2022-05-31 NOTE — PROGRESS NOTES
Physical Therapy Daily Progress Note + Discharge Summary      Patient: Tavia Gómez   : 1980  Referring practitioner: Chacho Chowdhury MD  Date of Initial Visit: Type: THERAPY  Noted: 3/15/2022  Today's Date: 2022  Patient seen for 17 sessions         Tavia Gómez reports: I survived [place] and my ankle did ok with minimal swelling; one day we got over 30,000 steps.       Objective   Subjective Questionnaire: LEFS: 67/80  Clinical Progress: improved  Home Program Compliance: Yes  Treatment has included: therapeutic exercise, neuromuscular re-education, manual therapy, therapeutic activity, gait training and cryotherapy        Objective      Active Range of Motion   Left Ankle/Foot   Dorsiflexion (ke): 12 degrees  Plantar flexion: 54 degrees   Inversion: 42 degrees   Eversion: 28 degrees      Passive Range of Motion   Left Ankle/Foot  WNL, no pain in any direction     Strength/Myotome Testing      Left Ankle/Foot   Dorsiflexion: 5  Plantar flexion: 4  Inversion: 4+  Eversion: 4+    Additional Strength Details  Able to complete B heel raise but difficulty with single leg heel raise on LLE     Swelling   Left Ankle/Foot   Metatarsal heads: 21 cm  Figure 8: 50 cm  Malleoli: 23 cm     Right Ankle/Foot   Malleoli: 22 cm     Ambulation      Observational Gait   Gait: WNL    Additional Observational Gait Details  With tennis shoes     Functional Assessment  SLB: >30 sec on LLE    Assessment & Plan     Assessment  Impairments: activity intolerance, impaired balance, impaired physical strength and pain with function  Functional Limitations: unable to perform repetitive tasks  Assessment details: Tavia Gómez is a 42 year-old female referred to physical therapy for s/p L ankle ORIF (DOS 21). Pt has progress gait to WNL in tennis shoes and normal daily shoes; swelling controlled despite increase activity over the weekend. Pt with improving L ankle AROM and PROM to WNL; still lacking full strength and  stability.  Improved static SL balance however impaired stability with more dynamic exercises on L with ladder drills  Pt is appropriate for discharge to [facility] at this time; issued return to running protocol. Pt reports she will be moving out of [place] next month and advised to follow up with a PT once she gets settled if she is still not back to her normal workouts painfree.   Prognosis: good    Goals  Plan Goals: ST weeks  1. Pt will have L ankle AROM WNL and pain-free in NWB position-MET  2. Pt will demonstrate gait WNL and be able to consistently walk for ~20 min to navigate the grocery store without boot-MET  3. Pt will be able to tolerate SLS on LLE for at least 10 sec without UE support to improve L ankle stability-MET  4. Pt will be able to complete at least 20 B heel/toe raises standing to improve strength-MET  5. Pt will decreased L malleoli swelling to 22 cm or less-PROGRESSING    LT weeks  1. Pt will demonstrates L ankle single leg heel raise without pain-PROGRESSING  2. Pt will demonstrate L ankle strength 5/5 in DF, PF, INV, and EV-PROGRESSING  3. Pt will be able to initiate plyometric training to be able to return to regular workouts and hiking-MET but still painful  4.Pt will be able to tolerate SLS on LLE for at least 30 sec without UE support to improve L ankle stability-MET  5. LEFS>60/80-MET    Plan  Plan details: Discharge to [facility] and return to running program            Timed:  Manual Therapy:    -     mins  72874;  Therapeutic Exercise:    -     mins  37113;     Neuromuscular Contreras:    -    mins  49237;    Therapeutic Activity:     15     mins  20322;     Gait Training:      -     mins  19408;     Ultrasound:     -     mins  48608;      Untimed:  Electrical Stimulation:    -     mins  65671 ( );  Mechanical Traction:    -     mins  27640;   Dry needling:      -     mins  /    Timed Treatment:   15   mins   Total Treatment:     15   mins  Anamaria Maynard PT  Physical  Therapist    License #: [id number]